# Patient Record
Sex: FEMALE | Race: WHITE | NOT HISPANIC OR LATINO | URBAN - NONMETROPOLITAN AREA
[De-identification: names, ages, dates, MRNs, and addresses within clinical notes are randomized per-mention and may not be internally consistent; named-entity substitution may affect disease eponyms.]

---

## 2019-08-16 NOTE — PATIENT DISCUSSION
(H52.203) Unspecified astigmatism, bilateral - Assesment : Refractive testing reveals astigmatism. - Plan : Glasses and contact lens Rx updated and given to help with visual acuity and function. RTC 1 year/EXAM. Us e 0.5% MYD for all future dilations.

## 2021-01-14 ENCOUNTER — IMPORTED ENCOUNTER (OUTPATIENT)
Dept: URBAN - NONMETROPOLITAN AREA CLINIC 1 | Facility: CLINIC | Age: 21
End: 2021-01-14

## 2021-01-14 PROBLEM — H52.03: Noted: 2021-01-14

## 2021-01-14 PROBLEM — H52.223: Noted: 2021-01-14

## 2021-01-14 PROCEDURE — 92015 DETERMINE REFRACTIVE STATE: CPT

## 2021-01-14 PROCEDURE — 92014 COMPRE OPH EXAM EST PT 1/>: CPT

## 2022-04-15 ASSESSMENT — VISUAL ACUITY
OU_OTHER: 20/40.
OS_CC: 20/60
OD_CC: 20/50

## 2022-04-15 ASSESSMENT — TONOMETRY
OS_IOP_MMHG: 13
OD_IOP_MMHG: 14